# Patient Record
(demographics unavailable — no encounter records)

---

## 2024-12-15 NOTE — CONSULT LETTER
[Dear  ___] : Dear  [unfilled], [Consult Letter:] : I had the pleasure of evaluating your patient, [unfilled]. [Please see my note below.] : Please see my note below. [Consult Closing:] : Thank you very much for allowing me to participate in the care of this patient.  If you have any questions, please do not hesitate to contact me. [Sincerely,] : Sincerely, [DrJavier  ___] : Dr. BROWN [FreeTextEntry3] : Myron Myron I. Kleiner, M.D., FACR  Chief, Division of Rheumatology Department of Medicine  [facility]  Chief, Division of Rheumatology  Department of Medicine   [facility]

## 2024-12-15 NOTE — ASSESSMENT
[FreeTextEntry1] : Impression: BRIANNA MOORE is a 76 year old woman who presents for follow up office visit for further evaluation of joint symptoms and rheumatic diseases including Sjogren's syndrome, osteoporosis, fibromyalgia.  Patient feels fairly well.  Denies recent joint pain. Some recent sleep disturbance and fatigue, despite getting 8 hours of sleep a night, secondary to fibromyalgia. Cardio Exercise via walking for 15 minutes - 1 hour every day. Recent dry eyes and dry mouth, on PE pt has dry eyes and slightly moist tongue secondary to Sjogren's Syndrome -- using artificial tears and oral hydration with relief, but denies using biotene mouthwash secondary to no need. Denies recent hair loss -- resolved. After Prolia Injection performed on August 1 2024, pt notes raised area or erythema at injection site x2 days, after injection pt notes myalgias and much fatigue which lasted for 2 months, at this time these pt symptoms are unlikely related to her Prolia injection. Recent lab results reveal ESR 28, +Anti La Antibody, otherwise unrevealing -- with extensive discussion. The patient continues calcium and vitamin D supplements for her osteoporosis. Patient is content with current medication regimen.   Plan: I reviewed patients chart and previous records with extensive discussion I reviewed recent lab results with patient with extensive discussion Laboratory tests ordered today - see list below - with coordination of care Diagnosis and prognosis discussed Continue current medications (other than those changed below)  Prolia 60 mg SQ q.6 months (Possible side effects explained including AVN of jaw) - To be authorized and performed -- with coordination of care with the authorization team and nursing staff--to be performed after February 4th, 2025 Pt should take Benadryl 25mg 30 minutes before Prolia injection -- with extensive discussion--- patient declined She is requesting that we perform UA and urine culture when she returns for her Prolia injection--order written Artificial tears one drop each eye q.i.d. and p.r.n.(Possible side effects explained) Biotene mouthwash/spray q.i.d. and p.r.n.(Possible side effects explained)  Oral hydration Patient declined oral medication for their dryness Continue daily exercise for at least 30 minutes/day--emphasized--extensive discussion Consider sleep study -- pt continues to decline -- with extensive discussion Return visit 4 months -- pt declined --she wants return visit 6 months All questions and concerns were addressed Total time for this office visit, including face-to-face time and non-face-to-face time, 87 minutes--- including review of the chart and previous records, detailed review of her medical history, review of previous lab results with extensive discussion with the patient, ordering lab tests with coordination of care, review of previous imaging reports/x-ray results, detailed medication history, review of medications going forward with their possible side effects, extensive discussion regarding symptoms that she relates to the previous Prolia injection as noted above, reviewed the modalities for treatment of her dryness with extensive discussion, recommended sleep study but she declined again, I urged her to increase her daily exercise to at least 30 minutes/day with extensive discussion, ordered her next Prolia injection with coordination of care with the authorization team and nursing staff, reviewed the impact of the patient's rheumatic disease on their other medical problems, reviewed the impact of the patient's other medical problems on their rheumatic disease

## 2024-12-15 NOTE — ADDENDUM
[FreeTextEntry1] :  I, Edmund Ramirez, acted solely as a scribe for Dr. Myron I. Kleiner, MD. on 12/12/2024. I personally performed the services described in the documentation, reviewed the documentation recorded by the scribe in my presence, and it accurately and completely records my words and actions. 
Improved

## 2024-12-15 NOTE — CONSULT LETTER
[Dear  ___] : Dear  [unfilled], [Consult Letter:] : I had the pleasure of evaluating your patient, [unfilled]. [Please see my note below.] : Please see my note below. [Consult Closing:] : Thank you very much for allowing me to participate in the care of this patient.  If you have any questions, please do not hesitate to contact me. [Sincerely,] : Sincerely, [DrJavier  ___] : Dr. BROWN [FreeTextEntry3] : Myron Myron I. Kleiner, M.D., FACR  Chief, Division of Rheumatology Department of Medicine  [facility]  Chief, Division of Rheumatology  Department of Medicine   [facility]

## 2024-12-15 NOTE — CONSULT LETTER
[Dear  ___] : Dear  [unfilled], [Consult Letter:] : I had the pleasure of evaluating your patient, [unfilled]. [Please see my note below.] : Please see my note below. [Consult Closing:] : Thank you very much for allowing me to participate in the care of this patient.  If you have any questions, please do not hesitate to contact me. [Sincerely,] : Sincerely, [DrJavier  ___] : Dr. BROWN [FreeTextEntry3] : Myron Myron I. Kleiner, M.D., FACR  Chief, Division of Rheumatology Department of Medicine  [facility]  Chief, Division of Rheumatology  Department of Medicine   [facility]